# Patient Record
Sex: FEMALE | Race: WHITE
[De-identification: names, ages, dates, MRNs, and addresses within clinical notes are randomized per-mention and may not be internally consistent; named-entity substitution may affect disease eponyms.]

---
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
## 2023-02-13 ENCOUNTER — NURSE TRIAGE (OUTPATIENT)
Dept: OTHER | Facility: CLINIC | Age: 18
End: 2023-02-13

## 2023-02-13 NOTE — TELEPHONE ENCOUNTER
Location of patient: [place]    Subjective: Caller states \"Since Friday I've had N/V, diarrhea, sore throat and dizziness\"     Current Symptoms: Nausea/vomiting x 2/24  Diarrhea- 3-4 /24 hours. Abdominal pain- middle and constant      Not able keep anything down. Sore throat    Cough and stuffy nose    Headache- relieved by ibuprofen. Pain on the sides 6/10    Onset: 3 days ago; unchanged    Associated Symptoms: reduced activity, reduced appetite, reduced fluid intake, increased sleepiness, diarrhea    Pain Severity: 6/10; aching, burning; constant    Temperature: unknown     What has been tried: pepcid and gas x , ibuprofen    LMP: NA Pregnant: No    Recommended disposition: See HCP within 4 Hours (or PCP triage)    Care advice provided, patient verbalizes understanding; denies any other questions or concerns; instructed to call back for any new or worsening symptoms. Patient/caller agrees to proceed to nearest [facility]     This triage is a result of a call to [address] [place] [address]. Please do not respond to the triage nurse through this encounter. Any subsequent communication should be directly with the patient.         Reason for Disposition   [1] Constant abdominal pain AND [2] present > 2 hours    Protocols used: Vomiting-ADULT-[place]

## 2023-03-03 ENCOUNTER — NURSE TRIAGE (OUTPATIENT)
Dept: OTHER | Facility: CLINIC | Age: 18
End: 2023-03-03

## 2023-03-03 NOTE — TELEPHONE ENCOUNTER
Location of patient: [place]    Subjective: Caller states \"I've had a really bad cough for the last two weeks. \"     Current Symptoms: cough (deep per patient)-  chest hurts when coughing. Coughing up yellow/green mucus. Coughing so hard patient feels like they may vomit. Onset: 2 weeks ago; worsening    Associated Symptoms:  SOB with movement    Pain Severity: 5/10; aching; moderate    Temperature: Chills/body aches    What has been tried: Robitussin    Pregnant: No    Recommended disposition: See in Office Today    Patient does not have a PCP. Agrees to go to nearest [facility] for evaluation. Care advice provided, patient verbalizes understanding; denies any other questions or concerns; instructed to call back for any new or worsening symptoms. Patient/caller agrees to proceed to nearest [facility]     This triage is a result of a call to [address] [place] [address]. Please do not respond to the triage nurse through this encounter. Any subsequent communication should be directly with the patient.     Reason for Disposition   MILD difficulty breathing (e.g., minimal/no SOB at rest, SOB with walking, pulse <100) and still present when not coughing    Protocols used: Cough-ADULT-[place]

## 2023-03-06 ENCOUNTER — OFFICE VISIT (OUTPATIENT)
Dept: PEDIATRICS | Facility: CLINIC | Age: 18
End: 2023-03-06
Payer: COMMERCIAL

## 2023-03-06 VITALS
WEIGHT: 163 LBS | SYSTOLIC BLOOD PRESSURE: 130 MMHG | BODY MASS INDEX: 25.53 KG/M2 | TEMPERATURE: 97.8 F | HEART RATE: 62 BPM | DIASTOLIC BLOOD PRESSURE: 83 MMHG

## 2023-03-06 DIAGNOSIS — B34.9 ACUTE VIRAL SYNDROME: ICD-10-CM

## 2023-03-06 DIAGNOSIS — R06.2 WHEEZE: Primary | ICD-10-CM

## 2023-03-06 PROBLEM — B96.89 BACTERIAL VAGINOSIS: Status: ACTIVE | Noted: 2023-03-06

## 2023-03-06 PROBLEM — N76.0 BACTERIAL VAGINOSIS: Status: ACTIVE | Noted: 2023-03-06

## 2023-03-06 PROBLEM — J32.0 CHRONIC MAXILLARY SINUSITIS: Status: ACTIVE | Noted: 2023-03-06

## 2023-03-06 PROBLEM — J02.9 SORE THROAT: Status: ACTIVE | Noted: 2023-03-06

## 2023-03-06 PROBLEM — F41.9 ANXIETY: Status: ACTIVE | Noted: 2023-03-06

## 2023-03-06 PROBLEM — F32.A DEPRESSION: Status: ACTIVE | Noted: 2023-03-06

## 2023-03-06 PROBLEM — T83.32XA IUD STRINGS LOST: Status: ACTIVE | Noted: 2023-03-06

## 2023-03-06 PROBLEM — J34.89 NASAL OBSTRUCTION: Status: ACTIVE | Noted: 2023-03-06

## 2023-03-06 PROBLEM — J32.2 CHRONIC ETHMOIDAL SINUSITIS: Status: ACTIVE | Noted: 2023-03-06

## 2023-03-06 PROBLEM — S00.83XA FACIAL CONTUSION, INITIAL ENCOUNTER: Status: ACTIVE | Noted: 2023-03-06

## 2023-03-06 PROBLEM — J34.3 NASAL TURBINATE HYPERTROPHY: Status: ACTIVE | Noted: 2023-03-06

## 2023-03-06 PROBLEM — M94.0 COSTOCHONDRITIS: Status: ACTIVE | Noted: 2023-03-06

## 2023-03-06 PROBLEM — N94.6 DYSMENORRHEA IN ADOLESCENT: Status: ACTIVE | Noted: 2023-03-06

## 2023-03-06 PROBLEM — M93.959 APOPHYSITIS OF ILIAC CREST: Status: ACTIVE | Noted: 2023-03-06

## 2023-03-06 PROBLEM — J34.2 NASAL SEPTAL DEVIATION: Status: ACTIVE | Noted: 2023-03-06

## 2023-03-06 PROBLEM — J30.9 ALLERGIC RHINITIS: Status: ACTIVE | Noted: 2023-03-06

## 2023-03-06 PROBLEM — J02.9 PHARYNGITIS, ACUTE: Status: ACTIVE | Noted: 2023-03-06

## 2023-03-06 PROBLEM — R53.83 FATIGUE: Status: ACTIVE | Noted: 2023-03-06

## 2023-03-06 PROBLEM — N89.8 VAGINAL DISCHARGE: Status: ACTIVE | Noted: 2023-03-06

## 2023-03-06 PROCEDURE — 99213 OFFICE O/P EST LOW 20 MIN: CPT | Performed by: PEDIATRICS

## 2023-03-06 PROCEDURE — 1036F TOBACCO NON-USER: CPT | Performed by: PEDIATRICS

## 2023-03-06 RX ORDER — FLUOXETINE HYDROCHLORIDE 60 MG/1
TABLET, FILM COATED ORAL; ORAL
COMMUNITY
Start: 2022-06-15

## 2023-03-06 RX ORDER — METHYLPHENIDATE HYDROCHLORIDE 18 MG/1
TABLET ORAL
COMMUNITY
Start: 2021-07-20

## 2023-03-06 RX ORDER — ATOMOXETINE 40 MG/1
CAPSULE ORAL
COMMUNITY
Start: 2021-05-26

## 2023-03-06 RX ORDER — LEVONORGESTREL 52 MG/1
INTRAUTERINE DEVICE INTRAUTERINE
COMMUNITY
Start: 2020-12-15

## 2023-03-06 RX ORDER — TRAZODONE HYDROCHLORIDE 50 MG/1
TABLET ORAL
COMMUNITY
Start: 2022-05-02

## 2023-03-06 RX ORDER — GLYCOPYRROLATE 1 MG/1
TABLET ORAL
COMMUNITY
Start: 2022-02-15 | End: 2023-10-26 | Stop reason: SDUPTHER

## 2023-03-06 NOTE — PATIENT INSTRUCTIONS
Use the albuterol every 4-6 hours as needed.  As he/she improves, you will wean the albuterol until you are not needing it.  If the wheezing returns in the near future, you can use it again,  If the wheezing and breathing don't improve with a treatment, then repeat it immediately and call us.  Look for signs of trouble breathing - such as using the belly to breath, or pulling in at the neck or collarbones.  CAll us with any concerns.

## 2023-03-06 NOTE — PROGRESS NOTES
/83   Pulse 62   Temp 36.6 °C (97.8 °F)   Wt 73.9 kg (163 lb)   BMI 25.53 kg/m² Subjective   Destiny Salinas is a 17 y.o. female who presents for Nasal Congestion and Shortness of Breath (Was seen at 3/3/22 [facility] acute sinus infection- gave inhaler, albuterol, cefdinir/Had covid 1 month ago).  HPI  Amonth ago had covid  Then got the stomach flu  Then the congestion was bad  Started on antibiotics and steroid and inhaler after the emergency room  No recent fever  Using the inhaler- seems to help and then needs it again  Feeling less pressure  Chest xray was negative at the [facility]    Came home from school because she was sick    Review of Systems    Objective   /83   Pulse 62   Temp 36.6 °C (97.8 °F)   Wt 73.9 kg (163 lb)   BMI 25.53 kg/m²   Percentiles: @SFA  @WFA  Physical Exam  General: Well-developed, well-nourished, alert and oriented, no acute distress.  Eyes: Normal sclera, PERRLA, EOM.  ENT: Moderate nasal discharge, mildly red throat but not beefy, no petechiae, Tms clear.  Cardiac: Regular rate and rhythm, normal S1/S2, no murmurs.  Pulmonary: good aeration B but B Exp wheezes and coughing  no G/F/R.  GI: Soft nondistended nontender abdomen without rebound or guarding.  .Skin: No rashes.  Lymph: No lymphadenopathy    Pulse ox 96% RA here    Assessment/Plan   Diagnoses and all orders for this visit:  Wheeze  Acute viral syndrome  Use the albuterol every 4-6 hours as needed.  As he/she improves, you will wean the albuterol until you are not needing it.  If the wheezing returns in the near future, you can use it again,  If the wheezing and breathing don't improve with a treatment, then repeat it immediately and call us.  Look for signs of trouble breathing - such as using the belly to breath, or pulling in at the neck or collarbones.  CAll us with any concerns.                       Shellie Arboleda MD

## 2023-05-20 LAB
CHLAMYDIA TRACH., AMPLIFIED: NEGATIVE
N. GONORRHEA, AMPLIFIED: POSITIVE
TRICHOMONAS VAGINALIS: NEGATIVE

## 2023-05-21 LAB — URINE CULTURE: NORMAL

## 2023-09-18 ENCOUNTER — NURSE TRIAGE (OUTPATIENT)
Dept: OTHER | Facility: CLINIC | Age: 18
End: 2023-09-18

## 2023-09-18 NOTE — TELEPHONE ENCOUNTER
Location of patient: [place]    Subjective: Caller states \"sinus pressure, green snot\"     Current Symptoms: sinus pressure- between eye brows and sides of nose. Green nasal drainage. Mouth breathing    Scratchy throat    Onset: 6 days ago; unchanged    Associated Symptoms: reduced activity, increased wakefulness decreased appetite    Pain Severity: 5/10; aching, throbbing; constant    Temperature: none     What has been tried: mucinex, ibuprofen, dayquil    LMP: NA Pregnant: No    Recommended disposition: See HCP within 4 Hours (or PCP triage)    Care advice provided, patient verbalizes understanding; denies any other questions or concerns; instructed to call back for any new or worsening symptoms. Patient/caller agrees to proceed to nearest [facility]     This triage is a result of a call to [address] [place] [address]. Please do not respond to the triage nurse through this encounter. Any subsequent communication should be directly with the patient.           Reason for Disposition   [1] Redness or swelling on the cheek, forehead or around the eye AND [2] no fever    Protocols used: Sinus Pain or Congestion-ADULT-

## 2023-09-20 ENCOUNTER — NURSE TRIAGE (OUTPATIENT)
Dept: OTHER | Facility: CLINIC | Age: 18
End: 2023-09-20

## 2023-09-20 NOTE — TELEPHONE ENCOUNTER
Location of patient: [place]    Subjective: Caller states \"Lower abdominal pain. \"     Current Symptoms:   \"Stabbing pain in L side of stomach, like my uterus. \"    Nauseas and severe pain. Just started 1 hour ago. I have an IUD. Onset: 1 hour ago; sudden    Associated Symptoms: NA    Pain Severity: 8/10; sharp; constant    Temperature: NA     What has been tried: NA    LMP:  IUD  Pregnant: Unknown    Recommended disposition: Go to ED Now    Care advice provided, patient verbalizes understanding; denies any other questions or concerns; instructed to call back for any new or worsening symptoms. Patient/caller agrees to proceed to nearest Emergency Department    This triage is a result of a call to [address] [place] [address]. Please do not respond to the triage nurse through this encounter. Any subsequent communication should be directly with the patient.     Reason for Disposition   [1] SEVERE pain (e.g., excruciating) AND [2] present > 1 hour    Protocols used: Abdominal Pain - Female-ADULT-

## 2023-10-20 ENCOUNTER — TELEMEDICINE (OUTPATIENT)
Dept: DERMATOLOGY | Facility: CLINIC | Age: 18
End: 2023-10-20
Payer: COMMERCIAL

## 2023-10-20 DIAGNOSIS — L74.519 PRIMARY FOCAL HYPERHIDROSIS: ICD-10-CM

## 2023-10-20 DIAGNOSIS — M67.40 GANGLION CYST: ICD-10-CM

## 2023-10-20 DIAGNOSIS — L70.0 ACNE VULGARIS: Primary | ICD-10-CM

## 2023-10-20 PROCEDURE — 99214 OFFICE O/P EST MOD 30 MIN: CPT | Performed by: NURSE PRACTITIONER

## 2023-10-20 RX ORDER — TRETINOIN 0.5 MG/G
CREAM TOPICAL NIGHTLY
Qty: 20 G | Refills: 1 | Status: SHIPPED | OUTPATIENT
Start: 2023-10-20 | End: 2024-03-22 | Stop reason: WASHOUT

## 2023-10-20 NOTE — PROGRESS NOTES
Eneida Salinas is a 18 y.o. female who presents for the following: Acne.  Face.  Patient previously seen by Rolanda melgoza August 9, 2022 for moles on left cheek and right chest.  Patient being seen today for acne has tried; clindamycin lotion 1%     Review of Systems:  No other skin or systemic complaints other than what is documented elsewhere in the note.    The following portions of the chart were reviewed this encounter and updated as appropriate:          Objective   Well appearing patient in no apparent distress; mood and affect are within normal limits.    A focused skin examination was performed. All findings within normal limits unless otherwise noted below.    Assessment/Plan   1. Acne vulgaris  Dorsum of Nose, Left Anterior Mandible, Left Buccal Cheek, Left Lower Cutaneous Lip, Left Lower Vermilion Lip, Left Malar Cheek, Left Nasal Sidewall, Left Parotid Area, Mid Lower Vermilion Lip, Right Alar Crease, Right Buccal Cheek, Right Lower Vermilion Lip, Right Malar Cheek, Right Nasal Sidewall  Primarily open comedones     Maintenance of Current Regimen: Please continue using the prescribed topical medications as directed. Consistency is key to maintaining the improvement achieved so far.    PLAN:  Start tretinoin 0.05% nightly      Further Treatment Options: If there are no significant improvements or if your acne worsens, we may consider additional treatment options such as oral medications or in-office procedures. However, it is important to note that most cases of mild acne can be managed effectively with the current regimen.    Skin Care Tips: Continue following a gentle cleansing routine, avoiding harsh scrubbing, and using non-comedogenic moisturizers to keep your skin hydrated without clogging pores.    Cleansing Routine: Gentle Cleanser: You have been using a mild, non-comedogenic cleanser to wash your face twice daily. This practice helps to remove excess oil, dirt, and impurities from  your skin, minimizing the risk of pore blockage.    Lifestyle Measures: Avoiding Trigger Factors: We also discussed avoiding known triggers such as excessive sun exposure, touching or picking at the acne lesions, and using heavy or comedogenic cosmetic products.    Please feel free to contact our clinic if you have any questions or concerns between appointments. Remember, acne treatment requires patience and consistency.                 Related Medications  tretinoin (Retin-A) 0.05 % cream  Apply topically once daily at bedtime. Apply a pea size amount nightly to entire face. Moisturize as needed    2. Ganglion cyst  Right Forearm - Posterior    Reassurance that no treatment is needed    3. Primary focal hyperhidrosis  Patient previously well controlled on 2 mg daily of oral glycopyrrolate however she self discontinued 1 year ago    Chief Complaint: The patient presents with excessive sweating.    History of Present Illness: The patient reports a long-standing history of excessive sweating, which has been a significant concern and source of distress. The sweating is more pronounced during periods of physical activity, warm weather, or emotional stress. The patient states that these symptoms have significantly impacted their daily life activities, causing discomfort, embarrassment, and interference with personal and professional relationships.    Past Medical History:  ° No significant medical conditions or allergies reported  ° Surgical history: None reported  ° Medications: No regular medications reported  ° Family history: No significant family history of hyperhidrosis    Social History: The patient is employed as a [occupation] and reports no significant exposure to environmental factors that could contribute to excessive sweating. The patient denies tobacco or alcohol use.    Review of Systems:  ° Cardiovascular: No known heart conditions or chest pain reported  ° Respiratory: No shortness of breath or wheezing  reported  ° Gastrointestinal: No abdominal pain, nausea, or vomiting reported  ° Neurological: No history of tremors or uncontrolled movements reported  ° Musculoskeletal: No joint pain or stiffness reported    Physical Examination: General Appearance: The patient appears well-nourished and in no acute distress.    Skin Examination:  ° The skin appears normal, with no signs of inflammation, rash, or infection.  ° No lesions, masses, or discolorations noted.  ° Areas of excessive sweating observed in the patient's hands, feet, and underarms, characterized by visible perspiration and clamminess.    Assessment: Based on the patient's history, physical examination findings, and symptoms reported, the primary diagnosis is hyperhidrosis, a condition characterized by excessive sweating beyond the body's thermoregulatory needs.    PLAN:  1. Patient Education: Explained the nature of hyperhidrosis, including its causes, triggers, and available treatment options.  Patient can restart glycopyrrolate 2 mg daily      Follow-Up: Scheduled a follow-up appointment to assess treatment response, discuss any concerns or questions, and make any necessary adjustments to the treatment plan.    Recommendations:  ° The patient is encouraged to maintain good personal hygiene and use absorbent materials to manage any resulting moisture or odor.  ° The patient should contact the clinic if any adverse reactions or concerns arise during the course of treatment.

## 2023-10-26 ENCOUNTER — TELEPHONE (OUTPATIENT)
Dept: DERMATOLOGY | Facility: CLINIC | Age: 18
End: 2023-10-26
Payer: COMMERCIAL

## 2023-10-26 ENCOUNTER — PATIENT MESSAGE (OUTPATIENT)
Dept: DERMATOLOGY | Facility: CLINIC | Age: 18
End: 2023-10-26
Payer: COMMERCIAL

## 2023-10-26 DIAGNOSIS — L74.519 PRIMARY FOCAL HYPERHIDROSIS: Primary | ICD-10-CM

## 2023-10-26 RX ORDER — GLYCOPYRROLATE 1 MG/1
1 TABLET ORAL 2 TIMES DAILY
Qty: 60 TABLET | Refills: 11 | Status: SHIPPED | OUTPATIENT
Start: 2023-10-26 | End: 2024-10-25

## 2023-10-26 NOTE — TELEPHONE ENCOUNTER
Patient called left message that she was able to get the tretinoin refill.  States that there was no refill of the glycopyrrolate at her [facility] in [place]. Please advise

## 2024-01-11 DIAGNOSIS — L70.0 ACNE VULGARIS: ICD-10-CM

## 2024-01-11 DIAGNOSIS — L70.0 ACNE VULGARIS: Primary | ICD-10-CM

## 2024-01-11 RX ORDER — CLINDAMYCIN PHOSPHATE 10 UG/ML
LOTION TOPICAL 2 TIMES DAILY
Qty: 60 ML | Refills: 1 | Status: SHIPPED | OUTPATIENT
Start: 2024-01-11

## 2024-01-11 RX ORDER — CLINDAMYCIN PHOSPHATE 10 UG/ML
LOTION TOPICAL 2 TIMES DAILY
Qty: 60 ML | Refills: 1 | Status: SHIPPED | OUTPATIENT
Start: 2024-01-11 | End: 2024-01-11 | Stop reason: SDUPTHER

## 2024-01-26 ENCOUNTER — APPOINTMENT (OUTPATIENT)
Dept: DERMATOLOGY | Facility: CLINIC | Age: 19
End: 2024-01-26
Payer: COMMERCIAL

## 2024-03-22 ENCOUNTER — OFFICE VISIT (OUTPATIENT)
Dept: DERMATOLOGY | Facility: CLINIC | Age: 19
End: 2024-03-22
Payer: COMMERCIAL

## 2024-03-22 DIAGNOSIS — L74.519 PRIMARY FOCAL HYPERHIDROSIS: ICD-10-CM

## 2024-03-22 DIAGNOSIS — L70.0 ACNE VULGARIS: ICD-10-CM

## 2024-03-22 DIAGNOSIS — D22.9 NEVUS: Primary | ICD-10-CM

## 2024-03-22 PROCEDURE — 1036F TOBACCO NON-USER: CPT | Performed by: NURSE PRACTITIONER

## 2024-03-22 PROCEDURE — 99214 OFFICE O/P EST MOD 30 MIN: CPT | Performed by: NURSE PRACTITIONER

## 2024-03-22 RX ORDER — BENZOYL PEROXIDE 100 MG/ML
1 LIQUID TOPICAL DAILY
Qty: 237 G | Refills: 11 | Status: SHIPPED | OUTPATIENT
Start: 2024-03-22 | End: 2025-03-22

## 2024-03-22 RX ORDER — ADAPALENE 0.1 G/100G
CREAM TOPICAL NIGHTLY
Qty: 45 G | Refills: 0 | Status: SHIPPED | OUTPATIENT
Start: 2024-03-22 | End: 2024-06-11 | Stop reason: WASHOUT

## 2024-03-22 NOTE — PROGRESS NOTES
Subjective     Jayne Salinas is a 18 y.o. female who presents for the following: Acne and Nevus.     Established patient in for follow up.   Patient states that she was prescribed tretinoin 0.05% but it caused rashes to her skin and increased breakouts patient states she tried for 2 months without success.  Patient is now using clindamycin and salicylic acid wash and states it is helpful for the breakouts but she still has blackheads to nose and chin.    Patient would like to have her moles on her back checked states that 1 mole in particular on her lower back was painful one occurrence.    Review of Systems:  No other skin or systemic complaints other than what is documented elsewhere in the note.    The following portions of the chart were reviewed this encounter and updated as appropriate:       Skin Cancer History  No skin cancer on file.    Specialty Problems    None    Past Medical History:  Jayne Salinas  has a past medical history of Allergy status to unspecified drugs, medicaments and biological substances, Body mass index (BMI) pediatric, 5th percentile to less than 85th percentile for age (10/08/2019), Encounter for contraceptive management, unspecified (03/08/2019), Encounter for immunization (10/08/2019), Encounter for immunization (10/05/2018), Encounter for routine child health examination without abnormal findings (10/08/2019), Encounter for screening for infections with a predominantly sexual mode of transmission (03/08/2019), Pain in unspecified foot (12/22/2014), Pain in unspecified hip (04/12/2018), Personal history of other diseases of the respiratory system (11/15/2019), Personal history of other diseases of the respiratory system (07/22/2014), Personal history of other diseases of the respiratory system (07/22/2014), Plantar fascial fibromatosis, and Viral infection, unspecified (01/28/2020).    Past Surgical History:  Jayne Salinas  has a past surgical history that includes Other surgical  history (03/08/2019).    Family History:  Patient family history is not on file.    Social History:  Jayne Salinas  reports that she has never smoked. She has never been exposed to tobacco smoke. She has never used smokeless tobacco. No history on file for alcohol use and drug use.    Allergies:  Patient has no known allergies.    Current Medications / CAM's:    Current Outpatient Medications:     adapalene (Differin) 0.1 % cream, Apply topically once daily at bedtime., Disp: 45 g, Rfl: 0    atomoxetine (Strattera) 40 mg capsule, Take by mouth., Disp: , Rfl:     benzoyl peroxide (Benzac AC) 10 % external wash, Apply 1 Application topically once daily., Disp: 237 g, Rfl: 11    clindamycin (Cleocin T) 1 % lotion, Apply topically 2 times a day., Disp: 60 mL, Rfl: 1    FLUoxetine (PROzac) 60 mg tablet, Take by mouth., Disp: , Rfl:     glycopyrrolate (Robinul) 1 mg tablet, Take 1 tablet (1 mg) by mouth 2 times a day., Disp: 60 tablet, Rfl: 11    levonorgestrel (Mirena) 21 mcg/24 hours (8 yrs) 52 mg IUD, by intrauterine route., Disp: , Rfl:     methylphenidate CR (Concerta) 18 mg daily tablet, Take by mouth., Disp: , Rfl:     traZODone (Desyrel) 50 mg tablet, Take by mouth., Disp: , Rfl:      Objective   Well appearing patient in no apparent distress; mood and affect are within normal limits.    A full examination was performed including scalp, head, eyes, ears, nose, lips, neck, chest, axillae, abdomen, back, buttocks, bilateral upper extremities, bilateral lower extremities, hands, feet, fingers, toes, fingernails, and toenails. All findings within normal limits unless otherwise noted below.    Assessment/Plan   1. Nevus  Right Lower Back  Multiple benign appearing flesh colored to pigmented macules and papules     Plan: Counseling.  I counseled the patient regarding the following:  Instructions: Monthly self-skin checks to monitor for any changes in moles are recommended. Expectations: Benign Nevi are pigmented nests  of cells within the skin.No treatment is necessary. Contact Office if: Any moles change in size, shape or color; itch, bum or bleed.    2. Acne vulgaris  Head - Anterior (Face)  Scattered open comedones to nose.    Maintenance of Current Regimen: Please continue using the prescribed topical medications as directed. Consistency is key to maintaining the improvement achieved so far.    PLAN:  Continue clindamycin 1% lotion daily   Start adapelene 0.1% cream nighlty  Start BPO 10% wash daily        Further Treatment Options: If there are no significant improvements or if your acne worsens, we may consider additional treatment options such as oral medications or in-office procedures. However, it is important to note that most cases of mild acne can be managed effectively with the current regimen.    Skin Care Tips: Continue following a gentle cleansing routine, avoiding harsh scrubbing, and using non-comedogenic moisturizers to keep your skin hydrated without clogging pores.    Cleansing Routine: Gentle Cleanser: You have been using a mild, non-comedogenic cleanser to wash your face twice daily. This practice helps to remove excess oil, dirt, and impurities from your skin, minimizing the risk of pore blockage.    Lifestyle Measures: Avoiding Trigger Factors: We also discussed avoiding known triggers such as excessive sun exposure, touching or picking at the acne lesions, and using heavy or comedogenic cosmetic products.    Please feel free to contact our clinic if you have any questions or concerns between appointments. Remember, acne treatment requires patience and consistency.                 adapalene (Differin) 0.1 % cream - Head - Anterior (Face)  Apply topically once daily at bedtime.    benzoyl peroxide (Benzac AC) 10 % external wash - Head - Anterior (Face)  Apply 1 Application topically once daily.    Related Procedures  Follow Up In Dermatology - Established Patient    Related Medications  clindamycin (Cleocin  T) 1 % lotion  Apply topically 2 times a day.    Related Procedures  Follow Up In Dermatology - Established Patient

## 2024-06-11 ENCOUNTER — OFFICE VISIT (OUTPATIENT)
Dept: DERMATOLOGY | Facility: CLINIC | Age: 19
End: 2024-06-11
Payer: COMMERCIAL

## 2024-06-11 DIAGNOSIS — L30.9 ECZEMA, UNSPECIFIED TYPE: ICD-10-CM

## 2024-06-11 DIAGNOSIS — L74.519 PRIMARY FOCAL HYPERHIDROSIS: ICD-10-CM

## 2024-06-11 DIAGNOSIS — L70.0 ACNE VULGARIS: Primary | ICD-10-CM

## 2024-06-11 PROCEDURE — 99214 OFFICE O/P EST MOD 30 MIN: CPT | Performed by: NURSE PRACTITIONER

## 2024-06-11 PROCEDURE — 1036F TOBACCO NON-USER: CPT | Performed by: NURSE PRACTITIONER

## 2024-06-11 RX ORDER — GLYCOPYRROLATE 1 MG/1
2 TABLET ORAL DAILY
Qty: 60 TABLET | Refills: 6 | Status: SHIPPED | OUTPATIENT
Start: 2024-06-11 | End: 2025-06-11

## 2024-06-11 RX ORDER — TRIAMCINOLONE ACETONIDE 1 MG/G
CREAM TOPICAL 2 TIMES DAILY PRN
Qty: 80 G | Refills: 0 | Status: SHIPPED | OUTPATIENT
Start: 2024-06-11

## 2024-06-11 RX ORDER — ADAPALENE GEL USP, 0.3% 3 MG/G
GEL TOPICAL NIGHTLY
Qty: 45 G | Refills: 1 | Status: SHIPPED | OUTPATIENT
Start: 2024-06-11 | End: 2025-06-11

## 2024-06-11 ASSESSMENT — DERMATOLOGY QUALITY OF LIFE (QOL) ASSESSMENT
RATE HOW EMOTIONALLY BOTHERED YOU ARE BY YOUR SKIN PROBLEM (FOR EXAMPLE, WORRY, EMBARRASSMENT, FRUSTRATION): 2
RATE HOW BOTHERED YOU ARE BY EFFECTS OF YOUR SKIN PROBLEMS ON YOUR ACTIVITIES (EG, GOING OUT, ACCOMPLISHING WHAT YOU WANT, WORK ACTIVITIES OR YOUR RELATIONSHIPS WITH OTHERS): 2
RATE HOW BOTHERED YOU ARE BY EFFECTS OF YOUR SKIN PROBLEMS ON YOUR ACTIVITIES (EG, GOING OUT, ACCOMPLISHING WHAT YOU WANT, WORK ACTIVITIES OR YOUR RELATIONSHIPS WITH OTHERS): 2
RATE HOW BOTHERED YOU ARE BY SYMPTOMS OF YOUR SKIN PROBLEM (EG, ITCHING, STINGING BURNING, HURTING OR SKIN IRRITATION): 2
WHAT SINGLE SKIN CONDITION LISTED BELOW IS THE PATIENT ANSWERING THE QUALITY-OF-LIFE ASSESSMENT QUESTIONS ABOUT: DERMATITIS
RATE HOW BOTHERED YOU ARE BY SYMPTOMS OF YOUR SKIN PROBLEM (EG, ITCHING, STINGING BURNING, HURTING OR SKIN IRRITATION): 2
RATE HOW EMOTIONALLY BOTHERED YOU ARE BY YOUR SKIN PROBLEM (FOR EXAMPLE, WORRY, EMBARRASSMENT, FRUSTRATION): 2
WHAT SINGLE SKIN CONDITION LISTED BELOW IS THE PATIENT ANSWERING THE QUALITY-OF-LIFE ASSESSMENT QUESTIONS ABOUT: DERMATITIS

## 2024-06-11 ASSESSMENT — PATIENT GLOBAL ASSESSMENT (PGA): WHAT IS THE PGA: PATIENT GLOBAL ASSESSMENT:  1 - CLEAR

## 2024-06-11 NOTE — PROGRESS NOTES
Subjective     Jayne Salinas is a 19 y.o. female who presents for the following: Acne and rash .   Follow up from last visit 03/2024 prescribed to   Continue clindamycin 1% lotion daily   Start adapelene 0.1% cream nighlty  Start BPO 10% wash daily    Patient has a new rash to right upper arm today patient applied hydrocortisone.     Review of Systems:  No other skin or systemic complaints other than what is documented elsewhere in the note.    The following portions of the chart were reviewed this encounter and updated as appropriate:       Skin Cancer History  No skin cancer on file.    Specialty Problems    None    Past Medical History:  Jayne Salinas  has a past medical history of Allergy status to unspecified drugs, medicaments and biological substances, Body mass index (BMI) pediatric, 5th percentile to less than 85th percentile for age (10/08/2019), Encounter for contraceptive management, unspecified (03/08/2019), Encounter for immunization (10/08/2019), Encounter for immunization (10/05/2018), Encounter for routine child health examination without abnormal findings (10/08/2019), Encounter for screening for infections with a predominantly sexual mode of transmission (03/08/2019), Pain in unspecified foot (12/22/2014), Pain in unspecified hip (04/12/2018), Personal history of other diseases of the respiratory system (11/15/2019), Personal history of other diseases of the respiratory system (07/22/2014), Personal history of other diseases of the respiratory system (07/22/2014), Plantar fascial fibromatosis, and Viral infection, unspecified (01/28/2020).    Past Surgical History:  Jayne Salinas  has a past surgical history that includes Other surgical history (03/08/2019).    Family History:  Patient family history is not on file.    Social History:  Jayne Salinas  reports that she has never smoked. She has never been exposed to tobacco smoke. She has never used smokeless tobacco. No history on file for  alcohol use and drug use.    Allergies:  Patient has no known allergies.    Current Medications / CAM's:    Current Outpatient Medications:     adapalene (Differin) 0.3 % gel, Apply topically once daily at bedtime., Disp: 45 g, Rfl: 1    atomoxetine (Strattera) 40 mg capsule, Take by mouth., Disp: , Rfl:     benzoyl peroxide (Benzac AC) 10 % external wash, Apply 1 Application topically once daily., Disp: 237 g, Rfl: 11    clindamycin (Cleocin T) 1 % lotion, Apply topically 2 times a day., Disp: 60 mL, Rfl: 1    FLUoxetine (PROzac) 60 mg tablet, Take by mouth., Disp: , Rfl:     glycopyrrolate (Robinul) 1 mg tablet, Take 2 tablets (2 mg) by mouth once daily., Disp: 60 tablet, Rfl: 6    levonorgestrel (Mirena) 21 mcg/24 hours (8 yrs) 52 mg IUD, by intrauterine route., Disp: , Rfl:     methylphenidate CR (Concerta) 18 mg daily tablet, Take by mouth., Disp: , Rfl:     traZODone (Desyrel) 50 mg tablet, Take by mouth., Disp: , Rfl:     triamcinolone (Kenalog) 0.1 % cream, Apply topically 2 times a day as needed for rash. On arm, Disp: 80 g, Rfl: 0     Objective   Well appearing patient in no apparent distress; mood and affect are within normal limits.      Assessment/Plan   1. Acne vulgaris  Head - Anterior (Face)  Improved, but scattered comedones persist to bilateral cheeks.    Maintenance of Current Regimen: Please continue using the prescribed topical medications as directed. Consistency is key to maintaining the improvement achieved so far.    PLAN:  Increase adapalene to 0.3% (from 0.1)  Continue clindamycin 1% lotion daily  Continue BPO 10% wash daily       Further Treatment Options: If there are no significant improvements or if your acne worsens, we may consider additional treatment options such as oral medications or in-office procedures. However, it is important to note that most cases of mild acne can be managed effectively with the current regimen.    Skin Care Tips: Continue following a gentle cleansing  routine, avoiding harsh scrubbing, and using non-comedogenic moisturizers to keep your skin hydrated without clogging pores.    Cleansing Routine: Gentle Cleanser: You have been using a mild, non-comedogenic cleanser to wash your face twice daily. This practice helps to remove excess oil, dirt, and impurities from your skin, minimizing the risk of pore blockage.    Lifestyle Measures: Avoiding Trigger Factors: We also discussed avoiding known triggers such as excessive sun exposure, touching or picking at the acne lesions, and using heavy or comedogenic cosmetic products.    Please feel free to contact our clinic if you have any questions or concerns between appointments. Remember, acne treatment requires patience and consistency.                 adapalene (Differin) 0.3 % gel - Head - Anterior (Face)  Apply topically once daily at bedtime.    Related Medications  clindamycin (Cleocin T) 1 % lotion  Apply topically 2 times a day.    benzoyl peroxide (Benzac AC) 10 % external wash  Apply 1 Application topically once daily.    2. Eczema, unspecified type  Right Forearm - Anterior  1 linear patch to antecubital fossa present x 1 week    Likely contact dermatitis    Treat with triamcinolone 0.1% cream twice daily     triamcinolone (Kenalog) 0.1 % cream - Right Forearm - Anterior  Apply topically 2 times a day as needed for rash. On arm    3. Primary focal hyperhidrosis (2)  Left Hand - Anterior; Right Hand - Anterior    Chief Complaint: The patient presents with excessive sweating.    History of Present Illness: The patient reports a long-standing history of excessive sweating, which has been a significant concern and source of distress. The sweating is more pronounced during periods of physical activity, warm weather, or emotional stress. The patient states that these symptoms have significantly impacted their daily life activities, causing discomfort, embarrassment, and interference with personal and professional  relationships.    Past Medical History:  ° No significant medical conditions or allergies reported  ° Surgical history: None reported  ° Medications: No regular medications reported  ° Family history: No significant family history of hyperhidrosis    Social History: The patient is employed as a [occupation] and reports no significant exposure to environmental factors that could contribute to excessive sweating. The patient denies tobacco or alcohol use.    Review of Systems:  ° Cardiovascular: No known heart conditions or chest pain reported  ° Respiratory: No shortness of breath or wheezing reported  ° Gastrointestinal: No abdominal pain, nausea, or vomiting reported  ° Neurological: No history of tremors or uncontrolled movements reported  ° Musculoskeletal: No joint pain or stiffness reported    Physical Examination: General Appearance: The patient appears well-nourished and in no acute distress.    Skin Examination:  ° The skin appears normal, with no signs of inflammation, rash, or infection.  ° No lesions, masses, or discolorations noted.  ° Areas of excessive sweating observed in the patient's hands, feet, and underarms, characterized by visible perspiration and clamminess.    Assessment: Based on the patient's history, physical examination findings, and symptoms reported, the primary diagnosis is hyperhidrosis, a condition characterized by excessive sweating beyond the body's thermoregulatory needs.    PLAN:  1. Patient Education: Explained the nature of hyperhidrosis, including its causes, triggers, and available treatment options.  2.  Patient would like to restart glycopyrrolate 2 mg daily as she sounded to be helpful in the past and notices swelling worsening in the summertime.  Will restart and reevaluate over the next 3 months    Follow-Up: Scheduled a follow-up appointment to assess treatment response, discuss any concerns or questions, and make any necessary adjustments to the treatment  plan.    Recommendations:  ° The patient is encouraged to maintain good personal hygiene and use absorbent materials to manage any resulting moisture or odor.  ° The patient should contact the clinic if any adverse reactions or concerns arise during the course of treatment.        Related Medications  glycopyrrolate (Robinul) 1 mg tablet  Take 2 tablets (2 mg) by mouth once daily.

## 2024-08-14 ENCOUNTER — APPOINTMENT (OUTPATIENT)
Dept: OBSTETRICS AND GYNECOLOGY | Facility: CLINIC | Age: 19
End: 2024-08-14
Payer: COMMERCIAL

## 2024-08-14 VITALS
DIASTOLIC BLOOD PRESSURE: 62 MMHG | BODY MASS INDEX: 20.09 KG/M2 | SYSTOLIC BLOOD PRESSURE: 126 MMHG | HEIGHT: 67 IN | WEIGHT: 128 LBS

## 2024-08-14 DIAGNOSIS — Z01.419 WELL WOMAN EXAM: Primary | ICD-10-CM

## 2024-08-14 DIAGNOSIS — Z12.4 CERVICAL CANCER SCREENING: ICD-10-CM

## 2024-08-14 DIAGNOSIS — T83.32XD INTRAUTERINE CONTRACEPTIVE DEVICE THREADS LOST, SUBSEQUENT ENCOUNTER: ICD-10-CM

## 2024-08-14 LAB — PREGNANCY TEST URINE, POC: NEGATIVE

## 2024-08-14 PROCEDURE — 87591 N.GONORRHOEAE DNA AMP PROB: CPT

## 2024-08-14 PROCEDURE — 1036F TOBACCO NON-USER: CPT | Performed by: ADVANCED PRACTICE MIDWIFE

## 2024-08-14 PROCEDURE — 87661 TRICHOMONAS VAGINALIS AMPLIF: CPT

## 2024-08-14 PROCEDURE — 99395 PREV VISIT EST AGE 18-39: CPT | Performed by: ADVANCED PRACTICE MIDWIFE

## 2024-08-14 PROCEDURE — 81025 URINE PREGNANCY TEST: CPT | Performed by: ADVANCED PRACTICE MIDWIFE

## 2024-08-14 PROCEDURE — 3008F BODY MASS INDEX DOCD: CPT | Performed by: ADVANCED PRACTICE MIDWIFE

## 2024-08-14 PROCEDURE — 87491 CHLMYD TRACH DNA AMP PROBE: CPT

## 2024-08-14 ASSESSMENT — ENCOUNTER SYMPTOMS
DYSURIA: 0
ARTHRALGIAS: 0
ABDOMINAL PAIN: 1
HEADACHES: 0
FLATUS: 0
VOMITING: 0
MYALGIAS: 0
DIARRHEA: 0
BELCHING: 0
WEIGHT LOSS: 0
ANOREXIA: 0
HEMATURIA: 0
FEVER: 0
HEMATOCHEZIA: 0
CONSTIPATION: 0
FREQUENCY: 0
NAUSEA: 1

## 2024-08-14 ASSESSMENT — PAIN SCALES - GENERAL: PAINLEVEL: 0-NO PAIN

## 2024-08-14 NOTE — PROGRESS NOTES
"Pt here for a follow up for ovarian cysts and IUD check. Pt is due for her first pap    Chaperone declined: Nicole Christian, CMA3      Assessment/Plan   Problem List Items Addressed This Visit             ICD-10-CM       Medium    IUD strings lost T83.32XA    Relevant Orders    POCT pregnancy, urine (Completed)    Well woman exam - Primary Z01.419     Pap done  STI panel  IUD in place via ultrasound, reassured.   RTO 1 year or prn             Ila ANUEL Brown, APRN-LUISAM     Subjective   Destiny Quallich \"Jayne\" is a 19 y.o. female who is here for a routine exam.     Has had two pelvic ultrasounds down at school for pelvic pain, thought it was a cyst.   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   Gastrointestinal:  Positive for abdominal pain and nausea. Negative for constipation, diarrhea and vomiting.   Genitourinary:  Negative for dysuria, frequency and hematuria.   Musculoskeletal:  Negative for arthralgias and myalgias.   Neurological:  Negative for headaches.       Objective   /62   Ht 1.702 m (5' 7\")   Wt 58.1 kg (128 lb)   LMP  (LMP Unknown) Comment: IUD  BMI 20.05 kg/m²     Physical Exam  Constitutional:       Appearance: Normal appearance.   HENT:      Head: Normocephalic.   Neck:      Thyroid: No thyroid mass, thyromegaly or " thyroid tenderness.   Cardiovascular:      Rate and Rhythm: Normal rate and regular rhythm.   Pulmonary:      Effort: Pulmonary effort is normal.      Breath sounds: Normal breath sounds.   Chest:   Breasts:     Right: Normal. No mass, nipple discharge or tenderness.      Left: Normal. No mass, nipple discharge or tenderness.   Abdominal:      Palpations: Abdomen is soft.   Genitourinary:     Vagina: Normal.      Cervix: Normal.      Comments: IUD strings not visible   Musculoskeletal:         General: Normal range of motion.   Lymphadenopathy:      Upper Body:      Right upper body: No axillary adenopathy.      Left upper body: No axillary adenopathy.   Skin:     General: Skin is warm and dry.   Neurological:      Mental Status: She is alert and oriented to person, place, and time.   Psychiatric:         Mood and Affect: Mood normal.

## 2024-08-16 LAB
C TRACH RRNA SPEC QL NAA+PROBE: NEGATIVE
N GONORRHOEA DNA SPEC QL PROBE+SIG AMP: NEGATIVE
T VAGINALIS RRNA SPEC QL NAA+PROBE: NEGATIVE

## 2024-08-19 LAB
CYTOLOGY CMNT CVX/VAG CYTO-IMP: NORMAL
LAB AP HPV HR: NORMAL
LAB AP PAP ADDITIONAL TESTS: NORMAL
LABORATORY COMMENT REPORT: NORMAL
PATH REPORT.TOTAL CANCER: NORMAL

## 2024-11-07 ENCOUNTER — TELEPHONE (OUTPATIENT)
Dept: OBSTETRICS AND GYNECOLOGY | Facility: CLINIC | Age: 19
End: 2024-11-07
Payer: COMMERCIAL

## 2024-11-07 NOTE — TELEPHONE ENCOUNTER
Pt contacted.    Pt asking to have a new mirena put in.    Believes her current iud has been in for 4/5 years.  Pt advised to check with insurance regarding coverage/benefit.  Understanding voiced.  Pt transferred to  to schedule.

## 2024-11-26 ENCOUNTER — APPOINTMENT (OUTPATIENT)
Dept: DERMATOLOGY | Facility: CLINIC | Age: 19
End: 2024-11-26
Payer: COMMERCIAL

## 2024-12-19 ENCOUNTER — APPOINTMENT (OUTPATIENT)
Dept: OBSTETRICS AND GYNECOLOGY | Facility: CLINIC | Age: 19
End: 2024-12-19
Payer: COMMERCIAL

## 2025-01-09 NOTE — PROGRESS NOTES
Subjective     Jayne Salinas is a 19 y.o. female who presents for the following: Acne, Eczema, and Excessive Sweating.   Established patient in for virtual follow up last seen 06/2024  and prescribed to   Acne vulgaris   Increase adapalene to 0.3% (from 0.1)  Continue clindamycin 1% lotion daily  Continue BPO 10% wash daily    Eczema, unspecified type  Right Forearm - Anterior  triamcinolone (Kenalog) 0.1 % cream   Apply topically 2 times a day as needed   Primary focal hyperhidrosis (2)  Left Hand - Anterior; Right Hand - Anterior       Review of Systems:  No other skin or systemic complaints other than what is documented elsewhere in the note.    The following portions of the chart were reviewed this encounter and updated as appropriate:       Skin Cancer History  No skin cancer on file.    Specialty Problems          Dermatology Problems    Facial contusion, initial encounter     Past Medical History:  Jayne Salinas  has a past medical history of Allergy status to unspecified drugs, medicaments and biological substances, Depression, and Gonorrhea.    Past Surgical History:  Jayne Salinas  has a past surgical history that includes Other surgical history (03/08/2019) and Anterior cruciate ligament repair.    Family History:  Patient family history includes Alcohol abuse in her paternal grandmother; Colon cancer in her paternal grandfather; Depression in her maternal grandmother; Drug abuse in her mother's sister; Hypertension in her father; Mental illness in her father, maternal grandmother, paternal grandfather, and paternal grandmother; No Known Problems in her mother.    Social History:  Jayne Salinas  reports that she has never smoked. She has never been exposed to tobacco smoke. She has never used smokeless tobacco. She reports current alcohol use of about 5.0 standard drinks of alcohol per week. She reports current drug use. Drug: Marijuana.    Allergies:  Clarithromycin    Current Medications / CAM's:     Current Outpatient Medications:     adapalene (Differin) 0.3 % gel, Apply topically once daily at bedtime., Disp: 45 g, Rfl: 1    atomoxetine (Strattera) 40 mg capsule, Take by mouth., Disp: , Rfl:     benzoyl peroxide (Benzac AC) 10 % external wash, Apply 1 Application topically once daily., Disp: 237 g, Rfl: 11    clindamycin (Cleocin T) 1 % lotion, Apply topically 2 times a day., Disp: 60 mL, Rfl: 1    FLUoxetine (PROzac) 60 mg tablet, Take by mouth., Disp: , Rfl:     glycopyrrolate (Robinul) 1 mg tablet, Take 2 tablets (2 mg) by mouth once daily., Disp: 60 tablet, Rfl: 6    levonorgestrel (Mirena) 21 mcg/24 hours (8 yrs) 52 mg IUD, by intrauterine route., Disp: , Rfl:     methylphenidate CR (Concerta) 18 mg daily tablet, Take by mouth., Disp: , Rfl:     pimecrolimus (Elidel) 1 % cream, Apply to affected areas once or twice daily for maintenance to prevent recurrence., Disp: 30 g, Rfl: 2    predniSONE (Deltasone) 20 mg tablet, Take 3 tablets by mouth (60mg) for 5 days, then 2 tablets (40mg) for 5 days, then 1 tablet (20mg) for 5 days., Disp: 30 tablet, Rfl: 0    traZODone (Desyrel) 50 mg tablet, Take by mouth., Disp: , Rfl:     triamcinolone (Kenalog) 0.1 % cream, Apply topically 2 times a day as needed for rash. On arm, Disp: 80 g, Rfl: 0    triamcinolone (Kenalog) 0.1 % cream, Apply topically 2 times a day as needed for rash., Disp: 80 g, Rfl: 5     Objective   Well appearing patient in no apparent distress; mood and affect are within normal limits.      Assessment/Plan   1. Other atopic dermatitis  Head - Anterior (Face)  Erythematous, eczematous patch(es)/plaque(s) with xerotic scale    -Discussed the nature of condition  -Recommend to utilize gentle skin care habits with gentle cleansers, limiting water exposure, and using liberal emollients. Recommend to use liberal emollients twice daily, one time applied immediately after shower while skin is still slightly damp. Use emollients to all areas of the body  that may be affected and use whether the rash is active or not. Use prescription medications before applying emollients.   -Discussed with/information to the patient on the risks, benefits and alternatives of the usage of topical corticosteroids, including but not limited to: atrophy (thinning of the skin), striae (stretch marks), telangiectasia (blood vessel growth), and dyspigmentation (discoloration of the skin).  -Recommend to limit long-term use of topical corticosteroids to less than 14 days per month to reduce risk of side effects.  -Recommend:    triamcinolone (Kenalog) 0.1 % cream - Head - Anterior (Face)  Apply topically 2 times a day as needed for rash.    predniSONE (Deltasone) 20 mg tablet - Head - Anterior (Face)  Take 3 tablets by mouth (60mg) for 5 days, then 2 tablets (40mg) for 5 days, then 1 tablet (20mg) for 5 days.    Related Procedures  Follow Up In Dermatology - Established Patient    2. Eczema of right upper eyelid  Right Supraorbital Region  Erythematous patches/plaques with xerotic scale    -Discussed nature of diagnosis and treatment options.  -Discussed with/information given to the patient on the risks, benefits and alternatives of the usage of topical corticosteroids, including but not limited to: atrophy (thinning of the skin), striae (stretch marks), telangiectasia (blood vessel growth), and dyspigmentation (discoloration of the skin).  -Recommend to limit long-term use of topical corticosteroids to less than 14 days per month to reduce risk of side effects.  -Recommend:     3. Eczema of left upper eyelid  Left Upper Eyelid  Erythematous patches/plaques with xerotic scale    -Discussed nature of diagnosis and treatment options.  -Discussed with/information given to the patient on the risks, benefits and alternatives of the usage of topical corticosteroids, including but not limited to: atrophy (thinning of the skin), striae (stretch marks), telangiectasia (blood vessel growth), and  dyspigmentation (discoloration of the skin).  -Recommend to limit long-term use of topical corticosteroids to less than 14 days per month to reduce risk of side effects.  -Recommend:     pimecrolimus (Elidel) 1 % cream - Left Upper Eyelid  Apply to affected areas once or twice daily for maintenance to prevent recurrence.

## 2025-01-10 ENCOUNTER — APPOINTMENT (OUTPATIENT)
Dept: DERMATOLOGY | Facility: CLINIC | Age: 20
End: 2025-01-10
Payer: COMMERCIAL

## 2025-01-10 DIAGNOSIS — H01.134 ECZEMA OF LEFT UPPER EYELID: ICD-10-CM

## 2025-01-10 DIAGNOSIS — L20.89 OTHER ATOPIC DERMATITIS: Primary | ICD-10-CM

## 2025-01-10 DIAGNOSIS — H01.131 ECZEMA OF RIGHT UPPER EYELID: ICD-10-CM

## 2025-01-10 PROCEDURE — 99214 OFFICE O/P EST MOD 30 MIN: CPT | Performed by: NURSE PRACTITIONER

## 2025-01-10 RX ORDER — PREDNISONE 20 MG/1
TABLET ORAL
Qty: 30 TABLET | Refills: 0 | Status: SHIPPED | OUTPATIENT
Start: 2025-01-10

## 2025-01-10 RX ORDER — PIMECROLIMUS 10 MG/G
CREAM TOPICAL
Qty: 30 G | Refills: 2 | Status: SHIPPED | OUTPATIENT
Start: 2025-01-10 | End: 2025-01-10 | Stop reason: SDUPTHER

## 2025-01-10 RX ORDER — TRIAMCINOLONE ACETONIDE 1 MG/G
CREAM TOPICAL 2 TIMES DAILY PRN
Qty: 80 G | Refills: 5 | Status: SHIPPED | OUTPATIENT
Start: 2025-01-10

## 2025-01-10 RX ORDER — PIMECROLIMUS 10 MG/G
CREAM TOPICAL
Qty: 30 G | Refills: 2 | Status: SHIPPED | OUTPATIENT
Start: 2025-01-10

## 2025-02-04 DIAGNOSIS — L20.89 OTHER ATOPIC DERMATITIS: Primary | ICD-10-CM

## 2025-02-04 RX ORDER — HYDROCORTISONE 25 MG/G
CREAM TOPICAL 2 TIMES DAILY
Qty: 28 G | Refills: 1 | Status: SHIPPED | OUTPATIENT
Start: 2025-02-04 | End: 2025-02-18

## 2025-03-06 ENCOUNTER — APPOINTMENT (OUTPATIENT)
Dept: DERMATOLOGY | Facility: CLINIC | Age: 20
End: 2025-03-06
Payer: COMMERCIAL

## 2025-03-06 DIAGNOSIS — D48.5 NEOPLASM OF UNCERTAIN BEHAVIOR OF SKIN: Primary | ICD-10-CM

## 2025-03-06 DIAGNOSIS — L74.519 PRIMARY FOCAL HYPERHIDROSIS: ICD-10-CM

## 2025-03-06 DIAGNOSIS — L20.89 OTHER ATOPIC DERMATITIS: ICD-10-CM

## 2025-03-06 DIAGNOSIS — L70.0 ACNE VULGARIS: ICD-10-CM

## 2025-03-06 PROCEDURE — 99213 OFFICE O/P EST LOW 20 MIN: CPT | Performed by: NURSE PRACTITIONER

## 2025-03-06 RX ORDER — HYDROCORTISONE 25 MG/G
CREAM TOPICAL 2 TIMES DAILY
Qty: 28 G | Refills: 1 | Status: SHIPPED | OUTPATIENT
Start: 2025-03-06 | End: 2025-03-20

## 2025-03-06 RX ORDER — TRIAMCINOLONE ACETONIDE 1 MG/G
CREAM TOPICAL 2 TIMES DAILY PRN
Qty: 80 G | Refills: 5 | Status: SHIPPED | OUTPATIENT
Start: 2025-03-06

## 2025-03-06 RX ORDER — GLYCOPYRROLATE 1 MG/1
2 TABLET ORAL DAILY
Qty: 60 TABLET | Refills: 6 | Status: SHIPPED | OUTPATIENT
Start: 2025-03-06 | End: 2026-03-06

## 2025-03-06 RX ORDER — CLINDAMYCIN PHOSPHATE 10 UG/ML
LOTION TOPICAL 2 TIMES DAILY
Qty: 60 ML | Refills: 1 | Status: SHIPPED | OUTPATIENT
Start: 2025-03-06

## 2025-03-06 RX ORDER — ADAPALENE 0.1 G/100G
CREAM TOPICAL NIGHTLY
Qty: 45 G | Refills: 2 | Status: SHIPPED | OUTPATIENT
Start: 2025-03-06 | End: 2026-03-06

## 2025-03-06 RX ORDER — BENZOYL PEROXIDE 100 MG/ML
1 LIQUID TOPICAL DAILY
Qty: 237 G | Refills: 11 | Status: SHIPPED | OUTPATIENT
Start: 2025-03-06 | End: 2026-03-06

## 2025-03-06 ASSESSMENT — DERMATOLOGY QUALITY OF LIFE (QOL) ASSESSMENT
WHAT SINGLE SKIN CONDITION LISTED BELOW IS THE PATIENT ANSWERING THE QUALITY-OF-LIFE ASSESSMENT QUESTIONS ABOUT: DERMATITIS
DATE THE QUALITY-OF-LIFE ASSESSMENT WAS COMPLETED: 67271
RATE HOW BOTHERED YOU ARE BY SYMPTOMS OF YOUR SKIN PROBLEM (EG, ITCHING, STINGING BURNING, HURTING OR SKIN IRRITATION): 5
RATE HOW EMOTIONALLY BOTHERED YOU ARE BY YOUR SKIN PROBLEM (FOR EXAMPLE, WORRY, EMBARRASSMENT, FRUSTRATION): 4
RATE HOW BOTHERED YOU ARE BY SYMPTOMS OF YOUR SKIN PROBLEM (EG, ITCHING, STINGING BURNING, HURTING OR SKIN IRRITATION): 5
RATE HOW BOTHERED YOU ARE BY EFFECTS OF YOUR SKIN PROBLEMS ON YOUR ACTIVITIES (EG, GOING OUT, ACCOMPLISHING WHAT YOU WANT, WORK ACTIVITIES OR YOUR RELATIONSHIPS WITH OTHERS): 3
RATE HOW EMOTIONALLY BOTHERED YOU ARE BY YOUR SKIN PROBLEM (FOR EXAMPLE, WORRY, EMBARRASSMENT, FRUSTRATION): 4
RATE HOW BOTHERED YOU ARE BY EFFECTS OF YOUR SKIN PROBLEMS ON YOUR ACTIVITIES (EG, GOING OUT, ACCOMPLISHING WHAT YOU WANT, WORK ACTIVITIES OR YOUR RELATIONSHIPS WITH OTHERS): 3
WHAT SINGLE SKIN CONDITION LISTED BELOW IS THE PATIENT ANSWERING THE QUALITY-OF-LIFE ASSESSMENT QUESTIONS ABOUT: DERMATITIS

## 2025-03-06 ASSESSMENT — PATIENT GLOBAL ASSESSMENT (PGA): WHAT IS THE PGA: PATIENT GLOBAL ASSESSMENT:  1 - CLEAR

## 2025-03-06 NOTE — PROGRESS NOTES
Subjective     Jayne Salinas is a 19 y.o. female who presents for the following: Excessive Sweating (BL hands/Tx with:/glycopyrrolate (Robinul) 1 mg tablet/Take 2 tablets (2 mg) by mouth once daily. Needs a refill/), Eczema (Located Right forearm AC fossa and Face/Tx with triamcinolone 0.1% cream twice daily prn and HC Cream for face/This works well and needs refills), and Acne (Located Face/Tx with:/clindamycin 1% lotion daily (wants to resume stopped after reaction) / BPO 10% wash daily / adapalene to 0.3% (from 0.1) increased LV (stopped when she had reaction on face wants to resume)/Leaves for [place] next week).     Review of Systems:  No other skin or systemic complaints other than what is documented elsewhere in the note.    The following portions of the chart were reviewed this encounter and updated as appropriate:          Skin Cancer History  No skin cancer on file.      Specialty Problems          Dermatology Problems    Facial contusion, initial encounter        Objective   Well appearing patient in no apparent distress; mood and affect are within normal limits.    A focused skin examination was performed. All findings within normal limits unless otherwise noted below.    Assessment/Plan   1. Neoplasm of uncertain behavior of skin  Left Lower Back              Lesion biopsy  Type of biopsy: tangential    Informed consent: discussed and consent obtained    Timeout: patient name, date of birth, surgical site, and procedure verified    Procedure prep:  Patient was prepped and draped  Anesthesia: the lesion was anesthetized in a standard fashion    Anesthetic:  1% lidocaine w/ epinephrine 1-100,000 local infiltration  Instrument used: DermaBlade    Hemostasis achieved with: aluminum chloride    Outcome: patient tolerated procedure well    Post-procedure details: sterile dressing applied and wound care instructions given    Dressing type: petrolatum and bandage      Staff Communication: Dermatology Local  Anesthesia: 1 % Lidocaine / Epinephrine - Amount: 1ml    Specimen 1 - Dermatopathology- DERM LAB  Differential Diagnosis: DPN vs other  Check Margins Yes/No?:    Comments:    Dermpath Lab: Routine Histopathology (formalin-fixed tissue)    2. Other atopic dermatitis    Related Procedures  Follow Up In Dermatology - Established Patient    Related Medications  triamcinolone (Kenalog) 0.1 % cream  Apply topically 2 times a day as needed for rash.    predniSONE (Deltasone) 20 mg tablet  Take 3 tablets by mouth (60mg) for 5 days, then 2 tablets (40mg) for 5 days, then 1 tablet (20mg) for 5 days.    hydrocortisone 2.5 % cream  Apply topically 2 times a day for 14 days.

## 2025-03-10 LAB
LABORATORY COMMENT REPORT: NORMAL
PATH REPORT.FINAL DX SPEC: NORMAL
PATH REPORT.GROSS SPEC: NORMAL
PATH REPORT.MICROSCOPIC SPEC OTHER STN: NORMAL
PATH REPORT.RELEVANT HX SPEC: NORMAL
PATH REPORT.TOTAL CANCER: NORMAL

## 2025-03-30 ENCOUNTER — CLINICAL SUPPORT (OUTPATIENT)
Dept: URGENT CARE | Age: 20
End: 2025-03-30
Payer: COMMERCIAL

## 2025-03-30 DIAGNOSIS — J40 BRONCHITIS: ICD-10-CM

## 2025-03-30 DIAGNOSIS — J45.21 MILD INTERMITTENT ASTHMA WITH EXACERBATION (HHS-HCC): Primary | ICD-10-CM

## 2025-03-30 PROCEDURE — 99214 OFFICE O/P EST MOD 30 MIN: CPT | Performed by: PHYSICIAN ASSISTANT

## 2025-03-30 RX ORDER — ALBUTEROL SULFATE 90 UG/1
2 INHALANT RESPIRATORY (INHALATION) EVERY 6 HOURS PRN
Qty: 18 G | Refills: 0 | Status: SHIPPED | OUTPATIENT
Start: 2025-03-30 | End: 2026-03-30

## 2025-03-30 RX ORDER — BROMPHENIRAMINE MALEATE, PSEUDOEPHEDRINE HYDROCHLORIDE, AND DEXTROMETHORPHAN HYDROBROMIDE 2; 30; 10 MG/5ML; MG/5ML; MG/5ML
SYRUP ORAL
Qty: 473 ML | Refills: 0 | Status: SHIPPED | OUTPATIENT
Start: 2025-03-30

## 2025-03-30 RX ORDER — PREDNISONE 20 MG/1
20 TABLET ORAL 2 TIMES DAILY
Qty: 10 TABLET | Refills: 0 | Status: SHIPPED | OUTPATIENT
Start: 2025-03-30 | End: 2025-04-04

## 2025-03-30 NOTE — PROGRESS NOTES
Urgent Care Virtual Video Visit    Patient Location: [place]  Provider Location: [facility]  Works for . Cough/congestion/runny nose started 6 days ago. Reports cough is really bad and cannot sleep well at night. Reports feeling chest is tight occasionally. Childhood asthma but last time when she had similar symptoms doctor gave her albuterol inhaler and it did help her to relieve symptoms. Denies fever, chills. Body aches. Possible exposure to RSV at work.    NAD, suspicious for viral syndrome/bronchitis  Will start treat with short course of steroid and refilled rescue inhaler  Educated when need to seek emergent medical attention    Video visit completed with realtime synchronous video/audio connection. Informed consent was obtained from the patient. Patient was made aware that my evaluation and diagnosis are limited due to the fact that we are not in the same room during the interview and that this is a virtual encounter that took place via videoconferencing. Patient verbalized understanding.     Patient disposition: Home    Electronically signed by Virtual Urgent Care  12:13 PM

## 2025-04-23 DIAGNOSIS — L20.89 OTHER ATOPIC DERMATITIS: Primary | ICD-10-CM

## 2025-04-23 RX ORDER — ADAPALENE GEL USP, 0.3% 3 MG/G
GEL TOPICAL NIGHTLY
Qty: 45 G | Refills: 1 | Status: SHIPPED | OUTPATIENT
Start: 2025-04-23 | End: 2026-04-23

## 2025-04-28 DIAGNOSIS — L20.89 OTHER ATOPIC DERMATITIS: Primary | ICD-10-CM

## 2025-04-28 RX ORDER — PIMECROLIMUS 10 MG/G
CREAM TOPICAL
Qty: 30 G | Refills: 2 | Status: SHIPPED | OUTPATIENT
Start: 2025-04-28

## 2025-07-10 NOTE — PROGRESS NOTES
Subjective     Jayne Salinas is a 20 y.o. female who presents for the following: Acne. Established patient in for virtual follow up last seen 03/2025  and prescribed to  Decrease adapalene to 0.1% continue clindamycin 1% lotion daily continue benzyl peroxide 10% wash daily      Review of Systems:  No other skin or systemic complaints other than what is documented elsewhere in the note.    The following portions of the chart were reviewed this encounter and updated as appropriate:          Skin Cancer History  Biopsy Log Book  No skin cancers from Specimen Tracking.    Additional History      Specialty Problems          Dermatology Problems    Facial contusion, initial encounter        Objective   Well appearing patient in no apparent distress; mood and affect are within normal limits.    A focused skin examination was performed. All findings within normal limits unless otherwise noted below.    Assessment/Plan   Skin Exam  1. ACNE VULGARIS  Head - Anterior (Face)  The patient is a 20-year-old female presenting with new and worsening acne, primarily involving large, cystic lesions along the jawline. She has a history of eczema. Current treatment includes adapalene 0.1% gel, clindamycin 1% lotion, and benzoyl peroxide wash. She reports minimal improvement. She has an IUD in place.    Objective:  Skin: Multiple inflammatory cystic acne lesions to the jawline. No active eczematous changes noted on today’s exam.    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      PLAN:  -Increase adapalene to 0.3% gel nightly.  -Continue clindamycin 1% lotion and benzoyl peroxide wash as tolerated.  -Initiate doxycycline 100 mg twice daily for inflammatory control.  -Discussed importance of consistent use of topicals alongside oral antibiotic for optimal results.  -Reviewed potential side effects of doxycycline including sun sensitivity and gastrointestinal upset.  -Will monitor response  over the next 8-12 weeks with follow-up as needed.    doxycycline (Monodox) 100 mg capsule - Head - Anterior (Face)  Take 1 capsule (100 mg) by mouth 2 times a day. Take with at least 8 ounces (large glass) of water, do not lie down for 30 minutes after    adapalene (Differin) 0.3 % gel - Head - Anterior (Face)  Apply a thin layer to affected areas once daily.  Related Procedures  Follow Up In Dermatology - Established Patient  Related Medications  clindamycin (Cleocin T) 1 % lotion  Apply topically 2 times a day.  benzoyl peroxide (Benzac AC) 10 % external wash  Apply 1 Application topically once daily.    Virtual or Telephone Consent    An interactive audio and video telecommunication system which permits real time communications between the patient (at the originating site) and provider (at the distant site) was utilized to provide this telehealth service.   Verbal consent was requested and obtained from Destiny Salinas on this date, 07/11/25 for a telehealth visit and the patient's location was confirmed at the time of the visit.

## 2025-07-11 ENCOUNTER — APPOINTMENT (OUTPATIENT)
Dept: DERMATOLOGY | Facility: CLINIC | Age: 20
End: 2025-07-11
Payer: COMMERCIAL

## 2025-07-11 DIAGNOSIS — L70.0 ACNE VULGARIS: Primary | ICD-10-CM

## 2025-07-11 PROCEDURE — 99214 OFFICE O/P EST MOD 30 MIN: CPT | Performed by: NURSE PRACTITIONER

## 2025-07-11 PROCEDURE — 1036F TOBACCO NON-USER: CPT | Performed by: NURSE PRACTITIONER

## 2025-07-11 RX ORDER — ADAPALENE GEL USP, 0.3% 3 MG/G
GEL TOPICAL
Qty: 45 G | Refills: 1 | Status: SHIPPED | OUTPATIENT
Start: 2025-07-11

## 2025-07-11 RX ORDER — DOXYCYCLINE 100 MG/1
100 CAPSULE ORAL 2 TIMES DAILY
Qty: 120 CAPSULE | Refills: 0 | Status: SHIPPED | OUTPATIENT
Start: 2025-07-11 | End: 2025-09-09

## 2025-07-11 ASSESSMENT — DERMATOLOGY QUALITY OF LIFE (QOL) ASSESSMENT
RATE HOW BOTHERED YOU ARE BY EFFECTS OF YOUR SKIN PROBLEMS ON YOUR ACTIVITIES (EG, GOING OUT, ACCOMPLISHING WHAT YOU WANT, WORK ACTIVITIES OR YOUR RELATIONSHIPS WITH OTHERS): 5
DATE THE QUALITY-OF-LIFE ASSESSMENT WAS COMPLETED: 67486
RATE HOW BOTHERED YOU ARE BY SYMPTOMS OF YOUR SKIN PROBLEM (EG, ITCHING, STINGING BURNING, HURTING OR SKIN IRRITATION): 6 - ALWAYS BOTHERED
RATE HOW EMOTIONALLY BOTHERED YOU ARE BY YOUR SKIN PROBLEM (FOR EXAMPLE, WORRY, EMBARRASSMENT, FRUSTRATION): 5
RATE HOW BOTHERED YOU ARE BY SYMPTOMS OF YOUR SKIN PROBLEM (EG, ITCHING, STINGING BURNING, HURTING OR SKIN IRRITATION): 6 - ALWAYS BOTHERED
RATE HOW EMOTIONALLY BOTHERED YOU ARE BY YOUR SKIN PROBLEM (FOR EXAMPLE, WORRY, EMBARRASSMENT, FRUSTRATION): 5
WHAT SINGLE SKIN CONDITION LISTED BELOW IS THE PATIENT ANSWERING THE QUALITY-OF-LIFE ASSESSMENT QUESTIONS ABOUT: ACNE
WHAT SINGLE SKIN CONDITION LISTED BELOW IS THE PATIENT ANSWERING THE QUALITY-OF-LIFE ASSESSMENT QUESTIONS ABOUT: ACNE
RATE HOW BOTHERED YOU ARE BY EFFECTS OF YOUR SKIN PROBLEMS ON YOUR ACTIVITIES (EG, GOING OUT, ACCOMPLISHING WHAT YOU WANT, WORK ACTIVITIES OR YOUR RELATIONSHIPS WITH OTHERS): 5

## 2025-07-11 ASSESSMENT — PATIENT GLOBAL ASSESSMENT (PGA): WHAT IS THE PGA: PATIENT GLOBAL ASSESSMENT:  3 - MODERATE

## 2025-07-11 NOTE — Clinical Note
PLAN:  -Increase adapalene to 0.3% gel nightly.  -Continue clindamycin 1% lotion and benzoyl peroxide wash as tolerated.  -Initiate doxycycline 100 mg twice daily for inflammatory control.  -Discussed importance of consistent use of topicals alongside oral antibiotic for optimal results.  -Reviewed potential side effects of doxycycline including sun sensitivity and gastrointestinal upset.  -Will monitor response over the next 8-12 weeks with follow-up as needed.

## 2025-07-11 NOTE — Clinical Note
The patient is a 20-year-old female presenting with new and worsening acne, primarily involving large, cystic lesions along the jawline. She has a history of eczema. Current treatment includes adapalene 0.1% gel, clindamycin 1% lotion, and benzoyl peroxide wash. She reports minimal improvement. She has an IUD in place.    Objective:  Skin: Multiple inflammatory cystic acne lesions to the jawline. No active eczematous changes noted on today’s exam.    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

## 2025-07-17 ENCOUNTER — APPOINTMENT (OUTPATIENT)
Dept: DERMATOLOGY | Facility: CLINIC | Age: 20
End: 2025-07-17
Payer: COMMERCIAL

## 2025-07-18 ENCOUNTER — TELEPHONE (OUTPATIENT)
Dept: DERMATOLOGY | Facility: CLINIC | Age: 20
End: 2025-07-18
Payer: COMMERCIAL

## 2025-07-18 NOTE — TELEPHONE ENCOUNTER
While editing biopsy log patient noted to have canceled [facility] yesterday and the patient marked in her notes for reason of cancellation that she needs to reschedule.  Please resubmit referral to surgical team.

## 2025-07-21 DIAGNOSIS — D23.5 DYSPLASTIC NEVUS OF TRUNK: Primary | ICD-10-CM

## 2025-08-05 DIAGNOSIS — D23.5 DYSPLASTIC NEVUS OF TRUNK: Primary | ICD-10-CM

## 2025-08-13 ENCOUNTER — OFFICE VISIT (OUTPATIENT)
Dept: URGENT CARE | Age: 20
End: 2025-08-13
Payer: COMMERCIAL

## 2025-08-13 VITALS
OXYGEN SATURATION: 96 % | SYSTOLIC BLOOD PRESSURE: 125 MMHG | RESPIRATION RATE: 20 BRPM | TEMPERATURE: 97.4 F | HEART RATE: 85 BPM | DIASTOLIC BLOOD PRESSURE: 78 MMHG

## 2025-08-13 DIAGNOSIS — T78.40XA ALLERGIC REACTION, INITIAL ENCOUNTER: Primary | ICD-10-CM

## 2025-08-13 DIAGNOSIS — L30.9 ECZEMA, UNSPECIFIED TYPE: ICD-10-CM

## 2025-08-13 DIAGNOSIS — L50.9 HIVES: ICD-10-CM

## 2025-08-13 RX ORDER — EPINEPHRINE 1 MG/ML
0.3 INJECTION INTRAMUSCULAR; INTRAVENOUS; SUBCUTANEOUS ONCE
Status: DISCONTINUED | OUTPATIENT
Start: 2025-08-13 | End: 2025-08-13

## 2025-08-13 RX ORDER — PREDNISONE 20 MG/1
TABLET ORAL
Qty: 18 TABLET | Refills: 0 | Status: SHIPPED | OUTPATIENT
Start: 2025-08-13 | End: 2025-08-22

## 2025-08-13 RX ORDER — METHYLPREDNISOLONE SODIUM SUCCINATE 125 MG/2ML
125 INJECTION INTRAMUSCULAR; INTRAVENOUS ONCE
Status: COMPLETED | OUTPATIENT
Start: 2025-08-13 | End: 2025-08-13

## 2025-08-13 RX ORDER — CETIRIZINE HYDROCHLORIDE 1 MG/ML
10 SOLUTION ORAL ONCE
Status: COMPLETED | OUTPATIENT
Start: 2025-08-13 | End: 2025-08-13

## 2025-08-13 RX ORDER — METHYLPREDNISOLONE 4 MG/1
TABLET ORAL
COMMUNITY
Start: 2025-06-15

## 2025-08-13 RX ORDER — BETAMETHASONE DIPROPIONATE 0.5 MG/G
CREAM TOPICAL 2 TIMES DAILY PRN
Qty: 45 G | Refills: 0 | Status: SHIPPED | OUTPATIENT
Start: 2025-08-13 | End: 2025-08-20

## 2025-08-13 RX ORDER — EPINEPHRINE 0.3 MG/.3ML
1 INJECTION SUBCUTANEOUS ONCE AS NEEDED
Qty: 1 EACH | Refills: 1 | Status: SHIPPED | OUTPATIENT
Start: 2025-08-13

## 2025-08-13 RX ORDER — HYDROXYZINE HYDROCHLORIDE 25 MG/1
TABLET, FILM COATED ORAL
COMMUNITY
Start: 2025-06-03

## 2025-08-13 RX ORDER — DIPHENHYDRAMINE HYDROCHLORIDE 50 MG/ML
25 INJECTION, SOLUTION INTRAMUSCULAR; INTRAVENOUS ONCE
Status: COMPLETED | OUTPATIENT
Start: 2025-08-13 | End: 2025-08-13

## 2025-08-13 RX ORDER — FAMOTIDINE 20 MG/1
20 TABLET, FILM COATED ORAL ONCE
Status: COMPLETED | OUTPATIENT
Start: 2025-08-13 | End: 2025-08-13

## 2025-08-13 RX ORDER — FAMOTIDINE 20 MG/1
20 TABLET, FILM COATED ORAL 2 TIMES DAILY
Qty: 14 TABLET | Refills: 0 | Status: SHIPPED | OUTPATIENT
Start: 2025-08-13 | End: 2025-08-20

## 2025-08-13 RX ADMIN — FAMOTIDINE 20 MG: 20 TABLET, FILM COATED ORAL at 11:15

## 2025-08-13 RX ADMIN — METHYLPREDNISOLONE SODIUM SUCCINATE 125 MG: 125 INJECTION INTRAMUSCULAR; INTRAVENOUS at 11:15

## 2025-08-13 RX ADMIN — CETIRIZINE HYDROCHLORIDE 10 MG: 1 SOLUTION ORAL at 11:13

## 2025-08-13 RX ADMIN — DIPHENHYDRAMINE HYDROCHLORIDE 25 MG: 50 INJECTION, SOLUTION INTRAMUSCULAR; INTRAVENOUS at 11:14

## 2025-09-17 ENCOUNTER — APPOINTMENT (OUTPATIENT)
Dept: OBSTETRICS AND GYNECOLOGY | Facility: CLINIC | Age: 20
End: 2025-09-17
Payer: COMMERCIAL

## 2025-09-18 ENCOUNTER — APPOINTMENT (OUTPATIENT)
Dept: DERMATOLOGY | Facility: CLINIC | Age: 20
End: 2025-09-18
Payer: COMMERCIAL

## 2025-09-30 ENCOUNTER — APPOINTMENT (OUTPATIENT)
Dept: GASTROENTEROLOGY | Facility: CLINIC | Age: 20
End: 2025-09-30
Payer: COMMERCIAL

## 2025-11-03 ENCOUNTER — APPOINTMENT (OUTPATIENT)
Dept: PRIMARY CARE | Facility: CLINIC | Age: 20
End: 2025-11-03
Payer: COMMERCIAL

## 2025-11-19 ENCOUNTER — APPOINTMENT (OUTPATIENT)
Dept: ALLERGY | Facility: CLINIC | Age: 20
End: 2025-11-19
Payer: COMMERCIAL

## 2025-12-11 ENCOUNTER — APPOINTMENT (OUTPATIENT)
Dept: DERMATOLOGY | Facility: CLINIC | Age: 20
End: 2025-12-11
Payer: COMMERCIAL

## 2025-12-29 ENCOUNTER — APPOINTMENT (OUTPATIENT)
Dept: ALLERGY | Facility: CLINIC | Age: 20
End: 2025-12-29
Payer: COMMERCIAL

## 2026-01-07 ENCOUNTER — APPOINTMENT (OUTPATIENT)
Dept: ALLERGY | Facility: CLINIC | Age: 21
End: 2026-01-07
Payer: COMMERCIAL